# Patient Record
Sex: FEMALE | Race: WHITE
[De-identification: names, ages, dates, MRNs, and addresses within clinical notes are randomized per-mention and may not be internally consistent; named-entity substitution may affect disease eponyms.]

---

## 2019-09-12 ENCOUNTER — HOSPITAL ENCOUNTER (OUTPATIENT)
Dept: HOSPITAL 62 - SC | Age: 79
Discharge: HOME | End: 2019-09-12
Attending: OPHTHALMOLOGY
Payer: MEDICARE

## 2019-09-12 DIAGNOSIS — G47.30: ICD-10-CM

## 2019-09-12 DIAGNOSIS — H25.811: Primary | ICD-10-CM

## 2019-09-12 PROCEDURE — V2632 POST CHMBR INTRAOCULAR LENS: HCPCS

## 2019-09-12 PROCEDURE — 00142 ANES PX ON EYE LENS SURGERY: CPT

## 2019-09-12 PROCEDURE — 66984 XCAPSL CTRC RMVL W/O ECP: CPT

## 2019-09-12 RX ADMIN — CYCLOPENTOLATE HYDROCHLORIDE AND PHENYLEPHRINE HYDROCHLORIDE PRN DROP: 2; 10 SOLUTION/ DROPS OPHTHALMIC at 11:11

## 2019-09-12 RX ADMIN — CYCLOPENTOLATE HYDROCHLORIDE AND PHENYLEPHRINE HYDROCHLORIDE PRN DROP: 2; 10 SOLUTION/ DROPS OPHTHALMIC at 11:18

## 2019-09-12 RX ADMIN — BESIFLOXACIN PRN DROP: 6 SUSPENSION OPHTHALMIC at 12:10

## 2019-09-12 RX ADMIN — TETRACAINE HYDROCHLORIDE PRN DROP: 5 SOLUTION OPHTHALMIC at 11:10

## 2019-09-12 RX ADMIN — DORZOLAMIDE HYDROCHLORIDE AND TIMOLOL MALEATE PRN DROP: 20; 5 SOLUTION OPHTHALMIC at 12:08

## 2019-09-12 RX ADMIN — TROPICAMIDE PRN DROP: 10 SOLUTION/ DROPS OPHTHALMIC at 11:10

## 2019-09-12 RX ADMIN — TROPICAMIDE PRN DROP: 10 SOLUTION/ DROPS OPHTHALMIC at 11:18

## 2019-09-12 RX ADMIN — DORZOLAMIDE HYDROCHLORIDE AND TIMOLOL MALEATE PRN DROP: 20; 5 SOLUTION OPHTHALMIC at 12:10

## 2019-09-12 RX ADMIN — BESIFLOXACIN PRN DROP: 6 SUSPENSION OPHTHALMIC at 12:08

## 2019-09-12 RX ADMIN — TROPICAMIDE PRN DROP: 10 SOLUTION/ DROPS OPHTHALMIC at 11:27

## 2019-09-12 RX ADMIN — BESIFLOXACIN PRN DROP: 6 SUSPENSION OPHTHALMIC at 11:28

## 2019-09-12 RX ADMIN — BESIFLOXACIN PRN DROP: 6 SUSPENSION OPHTHALMIC at 11:11

## 2019-09-12 RX ADMIN — CYCLOPENTOLATE HYDROCHLORIDE AND PHENYLEPHRINE HYDROCHLORIDE PRN DROP: 2; 10 SOLUTION/ DROPS OPHTHALMIC at 11:27

## 2019-09-12 RX ADMIN — TETRACAINE HYDROCHLORIDE PRN DROP: 5 SOLUTION OPHTHALMIC at 11:50

## 2019-09-12 RX ADMIN — TETRACAINE HYDROCHLORIDE PRN DROP: 5 SOLUTION OPHTHALMIC at 11:28

## 2019-10-02 NOTE — OPERATIVE REPORT
Operative Report-[facility]


Operative Report: 


Preoperative Diagnosis:  Nuclear and cortical cataract, right eye


Postoperative diagnosis: Nuclear and cortical cataract, right eye 


Procedure: Phacoemulsification and posterior chamber intraocular lens, right eye


Procedure date: September 12, 2019





Surgeon: Kaiden Mullins MD


Anesthetist: Dick Jones CRNA


Anesthesia: Topical with IV sedation


Complications: none


Tissue to pathology: none


Estimated blood loss: none





Indications for Surgery:


Mrs. Eric is an 79-year-old female who presented to our clinic complaining 

of difficulty reading and driving to too blurry vision in her right eye. On 

examination, she was found to have a best corrected visual acuity of 20/25 and 

with glare testing 20/40 in the right eye. Ophthamoscopy revealed  2+ nuclear 

and 2+ cortical degeneration cataract in the right eye with a normal appearing 

cornea, vitreous, retina and optic nerve.I discussed the findings of the exam 

with a patient. We discussed the risks, benefits and alternatives ofcataract 

extraction and intraocular lens implant as a means of improving her vision in 

the right eye. After our discussion, the patient indicated her interest in 

having this procedure performed by signing uninformed,Risks that were presented 

to the patient included infection, bleeding, retinal detachment and possible n

eed for additional surgery. Patient understands that she may need to wear 

glasses after surgery. After our discussion the patient indicated her interest 

in having this procedure performed by signing and informed witnessed consent 

form.





Report of Procedure:


On the day of surgery, the patient was given a topical application to the right 

eye that consisted of drops of 0.5% tetracaine, Tropicamide 1%, cyclomydril, 

Besivance 0.6% and Acular 0.45%. The patient was then taken to the operating 

room in a supine position in a standard eye bed.  Intravenous sedation was 

administered and she was prepped and draped in the standard ophthalmic fashion.A

time out was performed.Attention was then directed to the right eye where a 

paracentesis was created at the 1130 position at the limbus with a 15 blade. 

The anterior chamber was filled with 0.3 mL of 1% methylparaben free lidocaine 

and after 30 seconds the chamber was filled with viscoelastic material.A three-

plane corneal incision was then made at the 9 oclock position at the limbus  

with a cystotome. The lens was Hydro dissected using balanced saline solution. 

The lens nucleus was in removed using the stop and shop technique. CDE was 

10.95. The remaining cortical material was in removed from the posterior 

capsular bag using irrigation and aspiration. The posterior capsular bag was in 

filled with viscoelastic material and a lens implant was inserted into the 

posterior capsular bag. The lens chosen for this case was a one piece acrylic 

lens from [employer] model [id number], Serial number [id number]. The lens 

power is 16.0 dpt. The lens was removed from its package, inspected and found to

be free of defects. It was loaded into an alkaline monarch D . The lens 

 was advanced through the temporal limbal wound And the lens with 

advanced into the posterior capsular bag. It was centered in the bag with a 

Jenn spatula. The viscoelastic materials and removed from the eye using 

irrigation and aspiration. The wounds were closed by stromal hydration. They 

were tested with Weck-juanjose sponges and found to have no leaks. Intraocular 

pressure was assessed by manual palpation and found to be within physiologic 

range. Drops of Durezol, Combigan and gatifloxacin were instilled In the right 

eye, the drapes and speculum were removed, the periocular skin was washed with 

gauze In the eye was covered with a shield. The patient was then taken to the 

recovery area in good condition. She tolerated the procedure very well. She was 

given instructions to use gatifloxacin, Durezol and Ilevro every two hours while

awake today. She will return to my clinic tomorrow for follow up evaluation.